# Patient Record
Sex: FEMALE | Race: WHITE | NOT HISPANIC OR LATINO | URBAN - METROPOLITAN AREA
[De-identification: names, ages, dates, MRNs, and addresses within clinical notes are randomized per-mention and may not be internally consistent; named-entity substitution may affect disease eponyms.]

---

## 2025-01-17 ENCOUNTER — TELEPHONE (OUTPATIENT)
Dept: ORTHOPEDIC SURGERY | Facility: HOSPITAL | Age: 65
End: 2025-01-17

## 2025-01-17 NOTE — TELEPHONE ENCOUNTER
Received [facility] [employer] Form inquiry from website. Called patient to explain the [facility] program and let her know she is not eligible for the EVE program due to her insurance. Chloe expressed this is an ongoing issue that was recently exacerbated. She had a CT scan done in the ER on Wednesday and has a MRI ordered by her PCP which is scheduled for next Friday. Chloe stated she would like to schedule with someone in neurosurgery. I provided her the  Scheduling phone number for her to call and schedule an appointment with a provider. Amalia Villarreal RN